# Patient Record
Sex: MALE | Race: WHITE | NOT HISPANIC OR LATINO | Employment: FULL TIME | URBAN - METROPOLITAN AREA
[De-identification: names, ages, dates, MRNs, and addresses within clinical notes are randomized per-mention and may not be internally consistent; named-entity substitution may affect disease eponyms.]

---

## 2019-06-17 ENCOUNTER — AMBULATORY - RIVER FALLS (OUTPATIENT)
Dept: FAMILY MEDICINE | Facility: CLINIC | Age: 59
End: 2019-06-17

## 2021-10-27 ENCOUNTER — OFFICE VISIT - RIVER FALLS (OUTPATIENT)
Dept: FAMILY MEDICINE | Facility: CLINIC | Age: 61
End: 2021-10-27

## 2021-10-27 ASSESSMENT — MIFFLIN-ST. JEOR: SCORE: 1719.8

## 2022-02-11 VITALS
BODY MASS INDEX: 28.67 KG/M2 | TEMPERATURE: 97 F | DIASTOLIC BLOOD PRESSURE: 78 MMHG | HEIGHT: 70 IN | SYSTOLIC BLOOD PRESSURE: 126 MMHG | HEART RATE: 73 BPM | WEIGHT: 200.3 LBS

## 2022-02-16 NOTE — NURSING NOTE
CAGE Assessment Entered On:  10/27/2021 12:35 PM CDT    Performed On:  10/27/2021 12:35 PM CDT by Teresa Soriano MA               Assessment   Have you ever felt you should cut down on your drinking :   No   Have people annoyed you by criticizing your drinking :   No   Have you ever felt bad or guilty about your drinking :   No   Have you ever taken a drink first thing in the morning to steady your nerves or get rid of a hangover (Eye-opener) :   No   CAGE Score :   0    Teresa Soriano MA - 10/27/2021 12:35 PM CDT

## 2022-02-16 NOTE — PROGRESS NOTES
Patient:   IAN DELAROSA            MRN: [id number]            FIN: [id number]               Age:   61 years     Sex:  Male     :  1960   Associated Diagnoses:   Counseling for travel   Author:   Ruddy Monroy MD      Visit Information      Date of Service: 10/27/2021 09:51 am  Performing Location: [facility]  Encounter#: [id number]      Primary Care Provider (PCP):  NONE ,    Visit type:  Travel consultation.    Accompanied by   Source of history   History limitation:  None.       Chief Complaint   Fall Risk Screen  Not recorded for selected visit.      10/27/2021 10:15 AM CDT  annual px/travel px--traveling to [facility]      History of Present Illness   Dates of travel: 21-22  Itinerary:  Staying with family on [facility] [place]  Reason for trip:  pleasure  Travel style:  staying with family  Special activities:  none    Current health issues:  none    Quarantine required for 5 days in [place], negative PCR test           Review of Systems   Constitutional:  No fever, No chills, No sweats, No weakness, No fatigue.    Eye:  No recent visual problem.    Ear/Nose/Mouth/Throat:  No decreased hearing, No nasal congestion, No sore throat.    Respiratory:  No shortness of breath, No cough.    Cardiovascular:  Negative, No chest pain, No palpitations, No peripheral edema.    Gastrointestinal:  No nausea, No vomiting, No diarrhea, No constipation, No heartburn.    Genitourinary:  No dysuria, No change in urine stream.    Hematology/Lymphatics:  No bruising tendency, No bleeding tendency.    Endocrine:  No cold intolerance, No heat intolerance.    Immunologic:  Negative.    Musculoskeletal:  No back pain, No neck pain, No joint pain, No muscle pain.    Integumentary:  No rash, No dryness, No skin lesion.    Neurologic:  Alert and oriented X4, No headache.    Psychiatric:  No anxiety, No depression.       Health Status   Allergies:    Allergic Reactions (Selected)  Severity Not  Documented  Coconut Oil (Hives)   Medications:  (Selected)      Problem list:    All Problems  Tobacco user / SNOMED CT [id number] / Probable      Histories   Past Medical History:    No active or resolved past medical history items have been selected or recorded.   Family History:    No family history items have been selected or recorded.   Procedure history:    No active procedure history items have been selected or recorded.   Social History:        Electronic Cigarette/Vaping Assessment            Electronic Cigarette Use: Never.      Tobacco Assessment            10 or more cigarettes (1/2 pack or more)/day in last 30 days      Substance Abuse Assessment            Never        Physical Examination   Vital Signs   10/27/2021 10:15 AM CDT Temperature Tympanic 97 DegF  LOW    Peripheral Pulse Rate 73 bpm    Pulse Site Radial artery    HR Method Electronic    Systolic Blood Pressure 126 mmHg    Diastolic Blood Pressure 78 mmHg    Mean Arterial Pressure 94 mmHg    BP Site Right arm    BP Method Electronic      Measurements from flowsheet : Measurements   10/27/2021 10:15 AM CDT Height Measured - Standard 70 in    Weight Measured - Standard 200.3 lb    BSA 2.12 m2    Body Mass Index 28.74 kg/m2  HI      General:  Alert and oriented, No acute distress.    Respiratory:  Respirations are non-labored.    Cardiovascular:  Normal rate.       Impression and Plan   Diagnosis     Counseling for travel (FDE20-ID Z71.89).     Course:  Progressing as expected.      Hep A:  completed  Hep B:  completed  Malaria:  low risk area, not needed  Rabies:  not needed  Typhoid: yes  Yellow fever:  no  Influenza:  yes  Tetanus:  current     Traveler's diarrhea: azithromycin 500 mg qd for 3 days, ciprofloxacin 500 mg bid for 3 days, hand washing, food safety  Arthropod-borne disease risk reduction  Avoidance of MVC, DVT, AMS, drowning  Crime, security  Emergency evacuation insurance  Safe sex practices  Sun protection  Traveler  resources    [facility] travel information for the destination has been given.

## 2022-02-16 NOTE — NURSING NOTE
Comprehensive Intake Entered On:  10/27/2021 10:22 AM CDT    Performed On:  10/27/2021 10:15 AM CDT by Teresa Soriano MA               Summary   Chief Complaint :   annual px/travel px--traveling to [facility]     Weight Measured :   200.3 lb(Converted to: 200 lb 5 oz, 90.855 kg)    Height Measured :   70 in(Converted to: 5 ft 10 in, 177.80 cm)    Body Mass Index :   28.74 kg/m2 (HI)    Body Surface Area :   2.12 m2   Systolic Blood Pressure :   126 mmHg   Diastolic Blood Pressure :   78 mmHg   Mean Arterial Pressure :   94 mmHg   Peripheral Pulse Rate :   73 bpm   BP Site :   Right arm   Pulse Site :   Radial artery   BP Method :   Electronic   HR Method :   Electronic   Temperature Tympanic :   97 DegF(Converted to: 36.1 DegC)  (LOW)    Teresa Soriano MA - 10/27/2021 10:15 AM CDT   Health Status   Allergies Verified? :   Yes   Medication History Verified? :   Yes   Medical History Verified? :   Yes   Pre-Visit Planning Status :   Completed   Teresa Soriano MA - 10/27/2021 10:15 AM CDT   Consents   Consent for Immunization Exchange :   Consent Granted   Consent for Immunizations to Providers :   Consent Granted   Teresa Soriano MA - 10/27/2021 10:15 AM CDT   Meds / Allergies   (As Of: 10/27/2021 10:22:35 AM CDT)   Allergies (Active)   Coconut Oil  Estimated Onset Date:   Unspecified ; Reactions:   Hives ; Created By:   Teresa Soriano MA; Reaction Status:   Active ; Category:   Food ; Substance:   Coconut Oil ; Type:   Allergy ; Updated By:   Teresa Soriano MA; Source:   Patient ; Reviewed Date:   10/27/2021 10:20 AM CDT        Medication List   (As Of: 10/27/2021 10:22:35 AM CDT)   No Known Home Medications     Teresa Soriano MA - 10/27/2021 10:19:39 AM           Social History   Social History   (As Of: 10/27/2021 10:22:35 AM CDT)   Tobacco:        10 or more cigarettes (1/2 pack or more)/day in last 30 days   (Last Updated: 10/27/2021 10:20:38 AM CDT by Teresa Soriano MA)          Electronic  Cigarette/Vaping:        Electronic Cigarette Use: Never.   (Last Updated: 10/27/2021 10:20:43 AM CDT by Teresa Soriano MA)          Substance Abuse:        Never   (Last Updated: 10/27/2021 10:20:48 AM CDT by Teresa Soriano MA)

## 2022-02-16 NOTE — NURSING NOTE
Depression Screening Entered On:  10/27/2021 12:36 PM CDT    Performed On:  10/27/2021 12:35 PM CDT by Christie PANDYA, Teresa               Depression Screening   Little Interest - Pleasure in Activities :   Nearly every day   Feeling Down, Depressed, Hopeless :   Not at all   Initial Depression Screen Score :   3 Score   Poor Appetite or Overeating :   Several days   Trouble Falling or Staying Asleep :   Several days   Feeling Tired or Little Energy :   Not at all   Feeling Bad About Yourself :   Not at all   Trouble Concentrating :   Not at all   Moving or Speaking Slowly :   Not at all   Thoughts Better Off Dead or Hurting Self :   Not at all   Difficulty at Work, Home, Getting Along :   Not difficult at all   Detailed Depression Screen Score :   2    Total Depression Screen Score :   5    Teresa Soriano MA - 10/27/2021 12:35 PM CDT

## 2022-04-27 ENCOUNTER — ALLIED HEALTH/NURSE VISIT (OUTPATIENT)
Dept: FAMILY MEDICINE | Facility: CLINIC | Age: 62
End: 2022-04-27
Payer: OTHER GOVERNMENT

## 2022-04-27 DIAGNOSIS — Z23 HIGH PRIORITY FOR 2019-NCOV VACCINE: Primary | ICD-10-CM

## 2022-04-27 PROCEDURE — 99207 PR NO CHARGE NURSE ONLY: CPT | Performed by: FAMILY MEDICINE

## 2022-04-27 PROCEDURE — 0064A COVID-19,PF,MODERNA (18+ YRS BOOSTER .25ML): CPT | Performed by: FAMILY MEDICINE

## 2022-04-27 PROCEDURE — 91306 COVID-19,PF,MODERNA (18+ YRS BOOSTER .25ML): CPT | Performed by: FAMILY MEDICINE
